# Patient Record
Sex: MALE | URBAN - METROPOLITAN AREA
[De-identification: names, ages, dates, MRNs, and addresses within clinical notes are randomized per-mention and may not be internally consistent; named-entity substitution may affect disease eponyms.]

---

## 2018-08-31 ENCOUNTER — OFFICE VISIT (OUTPATIENT)
Dept: FAMILY MEDICINE | Facility: CLINIC | Age: 63
End: 2018-08-31
Payer: COMMERCIAL

## 2018-08-31 VITALS
HEART RATE: 91 BPM | WEIGHT: 217.2 LBS | RESPIRATION RATE: 18 BRPM | HEIGHT: 71 IN | TEMPERATURE: 97 F | OXYGEN SATURATION: 99 % | BODY MASS INDEX: 30.41 KG/M2 | DIASTOLIC BLOOD PRESSURE: 89 MMHG | SYSTOLIC BLOOD PRESSURE: 132 MMHG

## 2018-08-31 DIAGNOSIS — H92.01 OTALGIA, RIGHT: Primary | ICD-10-CM

## 2018-08-31 DIAGNOSIS — B20 HUMAN IMMUNODEFICIENCY VIRUS (HIV) DISEASE (H): ICD-10-CM

## 2018-08-31 DIAGNOSIS — H66.90 ACUTE OTITIS MEDIA, UNSPECIFIED OTITIS MEDIA TYPE: ICD-10-CM

## 2018-08-31 DIAGNOSIS — H60.393 INFECTIVE OTITIS EXTERNA, BILATERAL: ICD-10-CM

## 2018-08-31 RX ORDER — AMOXICILLIN 500 MG/1
500 CAPSULE ORAL 2 TIMES DAILY
Qty: 14 CAPSULE | Refills: 0 | Status: SHIPPED | OUTPATIENT
Start: 2018-08-31

## 2018-08-31 RX ORDER — CIPROFLOXACIN AND DEXAMETHASONE 3; 1 MG/ML; MG/ML
4 SUSPENSION/ DROPS AURICULAR (OTIC) 2 TIMES DAILY
Qty: 7.5 ML | Refills: 0 | Status: SHIPPED | OUTPATIENT
Start: 2018-08-31 | End: 2018-09-07

## 2018-08-31 ASSESSMENT — ENCOUNTER SYMPTOMS
FACIAL SWELLING: 0
DIARRHEA: 0
NECK PAIN: 0
WEAKNESS: 0
WHEEZING: 0
CHILLS: 0
RHINORRHEA: 0
SINUS PRESSURE: 0
SHORTNESS OF BREATH: 0
FATIGUE: 0
NAUSEA: 0
ACTIVITY CHANGE: 0
DIZZINESS: 0
SINUS PAIN: 0
EYE DISCHARGE: 0
COLOR CHANGE: 0
SORE THROAT: 0
FEVER: 0
CHOKING: 0
CHEST TIGHTNESS: 0
VOMITING: 0
CONSTIPATION: 0
LIGHT-HEADEDNESS: 0
WOUND: 0
COUGH: 0
TROUBLE SWALLOWING: 0

## 2018-08-31 ASSESSMENT — ANXIETY QUESTIONNAIRES
7. FEELING AFRAID AS IF SOMETHING AWFUL MIGHT HAPPEN: NOT AT ALL
6. BECOMING EASILY ANNOYED OR IRRITABLE: NOT AT ALL
IF YOU CHECKED OFF ANY PROBLEMS ON THIS QUESTIONNAIRE, HOW DIFFICULT HAVE THESE PROBLEMS MADE IT FOR YOU TO DO YOUR WORK, TAKE CARE OF THINGS AT HOME, OR GET ALONG WITH OTHER PEOPLE: NOT DIFFICULT AT ALL
1. FEELING NERVOUS, ANXIOUS, OR ON EDGE: NOT AT ALL
2. NOT BEING ABLE TO STOP OR CONTROL WORRYING: NOT AT ALL
3. WORRYING TOO MUCH ABOUT DIFFERENT THINGS: NOT AT ALL
GAD7 TOTAL SCORE: 0
5. BEING SO RESTLESS THAT IT IS HARD TO SIT STILL: NOT AT ALL

## 2018-08-31 ASSESSMENT — PATIENT HEALTH QUESTIONNAIRE - PHQ9: 5. POOR APPETITE OR OVEREATING: NOT AT ALL

## 2018-08-31 NOTE — PATIENT INSTRUCTIONS
External Ear Infection (Adult)    External otitis (also called  swimmer s ear ) is an infection in the ear canal. It is often caused by bacteria or fungus. It can occur a few days after water gets trapped in the ear canal (from swimming or bathing). It can also occur after cleaning too deeply in the ear canal with a cotton swab or other object. Sometimes, hair care products get into the ear canal and cause this problem.  Symptoms can include pain, fever, itching, redness, drainage, or swelling of the ear canal. Temporary hearing loss may also occur.  Home care    Do not try to clean the ear canal. This can push pus and bacteria deeper into the canal.    Use prescribed ear drops as directed. These help reduce swelling and fight the infection. If an ear wick was placed in the ear canal, apply drops right onto the end of the wick. The wick will draw the medicine into the ear canal even if it is swollen closed.    A cotton ball may be loosely placed in the outer ear to absorb any drainage.    You may use acetaminophen or ibuprofen to control pain, unless another medicine was prescribed. Note: If you have chronic liver or kidney disease or ever had a stomach ulcer or GI bleeding, talk to your healthcare provider before taking any of these medicines.    Do not allow water to get into your ear when bathing. Also, don't swim until the infection has cleared.  Prevention    Keep your ears dry. This helps lower the risk of infection. Dry your ears with a towel or hair dryer after getting wet. Also, use ear plugs when swimming.    Do not stick any objects in the ear to remove wax.    If you feel water trapped in your ear, use ear drops right away. You can get these drops over the counter at most drugstores. They work by removing water from the ear canal.  Follow-up care  Follow up with your healthcare provider in 1 week, or as advised.  When to seek medical advice  Call your healthcare provider right away if any of these  occur:    Ear pain becomes worse or doesn t improve after 3 days of treatment    Redness or swelling of the outer ear occurs or gets worse    Headache    Painful or stiff neck    Drowsiness or confusion    Fever of 100.4 F (38 C) or higher, or as directed by your healthcare provider    Seizure  Date Last Reviewed: 10/1/2017    [id number] [employer]. [address] [place] [address]. All rights reserved. This information is not intended as a substitute for professional medical care. Always follow your healthcare professional's instructions.      Amoxicillin capsules or tablets  Brand Names: Amoxil, Moxilin, Sumox, Trimox  What is this medicine?  AMOXICILLIN (a mox i MAT in) is a penicillin antibiotic. It is used to treat certain kinds of bacterial infections. It will not work for colds, flu, or other viral infections.  How should I use this medicine?  Take this medicine by mouth with a glass of water. Follow the directions on your prescription label. You may take this medicine with food or on an empty stomach. Take your medicine at regular intervals. Do not take your medicine more often than directed. Take all of your medicine as directed even if you think your are better. Do not skip doses or stop your medicine early.  Talk to your pediatrician regarding the use of this medicine in children. While this drug may be prescribed for selected conditions, precautions do apply.  What side effects may I notice from receiving this medicine?  Side effects that you should report to your doctor or health care professional as soon as possible:    allergic reactions like skin rash, itching or hives, swelling of the face, lips, or tongue    breathing problems    dark urine    redness, blistering, peeling or loosening of the skin, including inside the mouth    seizures    severe or watery diarrhea    trouble passing urine or change in the amount of urine    unusual bleeding or bruising    unusually weak or  tired    yellowing of the eyes or skin  Side effects that usually do not require medical attention (report to your doctor or health care professional if they continue or are bothersome):    dizziness    headache    stomach upset    trouble sleeping  What may interact with this medicine?    amiloride    birth control pills    chloramphenicol    macrolides    probenecid    sulfonamides    tetracyclines    What if I miss a dose?  If you miss a dose, take it as soon as you can. If it is almost time for your next dose, take only that dose. Do not take double or extra doses.  Where should I keep my medicine?  Keep out of the reach of children.  Store between 68 and 77 degrees F (20 and 25 degrees C). Keep bottle closed tightly. Throw away any unused medicine after the expiration date.  What should I tell my health care provider before I take this medicine?  They need to know if you have any of these conditions:    asthma    kidney disease    an unusual or allergic reaction to amoxicillin, other penicillins, cephalosporin antibiotics, other medicines, foods, dyes, or preservatives    pregnant or trying to get pregnant    breast-feeding  What should I watch for while using this medicine?  Tell your doctor or health care professional if your symptoms do not improve in 2 or 3 days. Take all of the doses of your medicine as directed. Do not skip doses or stop your medicine early.  If you are diabetic, you may get a false positive result for sugar in your urine with certain brands of urine tests. Check with your doctor.  Do not treat diarrhea with over-the-counter products. Contact your doctor if you have diarrhea that lasts more than 2 days or if the diarrhea is severe and watery.  NOTE:This sheet is a summary. It may not cover all possible information. If you have questions about this medicine, talk to your doctor, pharmacist, or health care provider. Copyright  2018 [employer]        Ciprofloxacin Hydrochloride, Dexamethasone  Otic drops, suspension  What is this medicine?  CIPROFLOXACIN; DEXAMETHASONE (sip lexi FLOX a sin; dex a METH a sone) is used to treat ear infections. It also stops the swelling and itching caused by the infection.  This medicine may be used for other purposes; ask your health care provider or pharmacist if you have questions.  What should I tell my health care provider before I take this medicine?  They need to know if you have any of these conditions:    any other active infection    viral ear infection    an unusual or allergic reaction to ciprofloxacin; dexamethasone, other medicines, foods, dyes, or preservatives    pregnant or trying to get pregnant    breast-feeding  How should I use this medicine?  This medicine is only for use in the ears. Wash your hands with soap and water. Do not insert any object or swab into the ear canal. Gently warm the bottle by holding it in the hand for 1 to 2 minutes. Shake the bottle immediately before using. Lie down on your side with the affected ear up. Try not to touch the tip of the dropper to your ear, fingertips, or other surface. Squeeze the bottle gently to put the prescribed number of drops in the ear canal. Stay in this position for 30 to 60 seconds to help the drops soak into the ear. Repeat the steps for the other ear if both ears are infected. Do not use your medicine more often than directed. Finish the full course of medicine prescribed by your doctor or health care professional even if you think your condition is better.  Talk to your pediatrician regarding the use of this medicine in children. While this drug may be prescribed for children as young as in children 6 months of age and older for selected conditions, precautions do apply.  Overdosage: If you think you have taken too much of this medicine contact a poison control center or emergency room at once.  NOTE: This medicine is only for you. Do not share this medicine with others.  What if I miss a dose?  If  you miss a dose, use it as soon as you can. If it is almost time for your next dose, use only that dose. Do not use double or extra doses.  What may interact with this medicine?  Interactions are not expected. Do not use any other ear products without telling your doctor or health care professional.  This list may not describe all possible interactions. Give your health care provider a list of all the medicines, herbs, non-prescription drugs, or dietary supplements you use. Also tell them if you smoke, drink alcohol, or use illegal drugs. Some items may interact with your medicine.  What should I watch for while using this medicine?  Tell your doctor or health care professional if your ear infection does not get better in a few days. If rash or allergic reaction occurs, stop using immediately and contact your doctor or health care professional.  It is important that you keep the infected ear(s) clean and dry. When bathing, try not to get the infected ear(s) wet. Do not go swimming unless your doctor or health care professional has told you otherwise.  To prevent the spread of infection, do not share ear products or share towels and washcloths with anyone else.  What side effects may I notice from receiving this medicine?  Side effects that you should report to your doctor or health care professional as soon as possible:    allergic reactions like skin rash, itching or hives, swelling of the face, lips, or tongue    burning, itching, and redness    worsening ear pain  Side effects that usually do not require medical attention (report to your doctor or health care professional if they continue or are bothersome):    abnormal feeling in the ear    headache    unpleasant feeling while putting the drops in the ear  This list may not describe all possible side effects. Call your doctor for medical advice about side effects. You may report side effects to FDA at 0-598-FDA-5594.  Where should I keep my medicine?  Keep out  of the reach of children.  Store at room temperature between 15 and 30 degrees C (59 and 86 degrees F). Do not freeze. Protect from light. Throw away any unused medicine after the expiration date.  NOTE:This sheet is a summary. It may not cover all possible information. If you have questions about this medicine, talk to your doctor, pharmacist, or health care provider. Copyright  2016 [employer]

## 2018-08-31 NOTE — MR AVS SNAPSHOT
After Visit Summary   8/31/2018    Morgan Ferris    MRN: [id number]           Patient Information     Date Of Birth          1955        Visit Information        Provider Department      8/31/2018 1:15 PM Laura Olivera APRN CNP [facility]        Today's Diagnoses     Otalgia, right    -  1    Infective otitis externa, bilateral        Acute otitis media, unspecified otitis media type          Care Instructions      External Ear Infection (Adult)    External otitis (also called  swimmer s ear ) is an infection in the ear canal. It is often caused by bacteria or fungus. It can occur a few days after water gets trapped in the ear canal (from swimming or bathing). It can also occur after cleaning too deeply in the ear canal with a cotton swab or other object. Sometimes, hair care products get into the ear canal and cause this problem.  Symptoms can include pain, fever, itching, redness, drainage, or swelling of the ear canal. Temporary hearing loss may also occur.  Home care    Do not try to clean the ear canal. This can push pus and bacteria deeper into the canal.    Use prescribed ear drops as directed. These help reduce swelling and fight the infection. If an ear wick was placed in the ear canal, apply drops right onto the end of the wick. The wick will draw the medicine into the ear canal even if it is swollen closed.    A cotton ball may be loosely placed in the outer ear to absorb any drainage.    You may use acetaminophen or ibuprofen to control pain, unless another medicine was prescribed. Note: If you have chronic liver or kidney disease or ever had a stomach ulcer or GI bleeding, talk to your healthcare provider before taking any of these medicines.    Do not allow water to get into your ear when bathing. Also, don't swim until the infection has cleared.  Prevention    Keep your ears dry. This helps lower the risk of infection. Dry your ears with a towel or hair dryer  after getting wet. Also, use ear plugs when swimming.    Do not stick any objects in the ear to remove wax.    If you feel water trapped in your ear, use ear drops right away. You can get these drops over the counter at most drugstores. They work by removing water from the ear canal.  Follow-up care  Follow up with your healthcare provider in 1 week, or as advised.  When to seek medical advice  Call your healthcare provider right away if any of these occur:    Ear pain becomes worse or doesn t improve after 3 days of treatment    Redness or swelling of the outer ear occurs or gets worse    Headache    Painful or stiff neck    Drowsiness or confusion    Fever of 100.4 F (38 C) or higher, or as directed by your healthcare provider    Seizure  Date Last Reviewed: 10/1/2017    9368-3402 [employer]. [address] [place], [place] [address]. All rights reserved. This information is not intended as a substitute for professional medical care. Always follow your healthcare professional's instructions.      Amoxicillin capsules or tablets  Brand Names: Amoxil, Moxilin, Sumox, Trimox  What is this medicine?  AMOXICILLIN (a mox i MAT in) is a penicillin antibiotic. It is used to treat certain kinds of bacterial infections. It will not work for colds, flu, or other viral infections.  How should I use this medicine?  Take this medicine by mouth with a glass of water. Follow the directions on your prescription label. You may take this medicine with food or on an empty stomach. Take your medicine at regular intervals. Do not take your medicine more often than directed. Take all of your medicine as directed even if you think your are better. Do not skip doses or stop your medicine early.  Talk to your pediatrician regarding the use of this medicine in children. While this drug may be prescribed for selected conditions, precautions do apply.  What side effects may I notice from receiving this medicine?  Side effects  that you should report to your doctor or health care professional as soon as possible:    allergic reactions like skin rash, itching or hives, swelling of the face, lips, or tongue    breathing problems    dark urine    redness, blistering, peeling or loosening of the skin, including inside the mouth    seizures    severe or watery diarrhea    trouble passing urine or change in the amount of urine    unusual bleeding or bruising    unusually weak or tired    yellowing of the eyes or skin  Side effects that usually do not require medical attention (report to your doctor or health care professional if they continue or are bothersome):    dizziness    headache    stomach upset    trouble sleeping  What may interact with this medicine?    amiloride    birth control pills    chloramphenicol    macrolides    probenecid    sulfonamides    tetracyclines    What if I miss a dose?  If you miss a dose, take it as soon as you can. If it is almost time for your next dose, take only that dose. Do not take double or extra doses.  Where should I keep my medicine?  Keep out of the reach of children.  Store between 68 and 77 degrees F (20 and 25 degrees C). Keep bottle closed tightly. Throw away any unused medicine after the expiration date.  What should I tell my health care provider before I take this medicine?  They need to know if you have any of these conditions:    asthma    kidney disease    an unusual or allergic reaction to amoxicillin, other penicillins, cephalosporin antibiotics, other medicines, foods, dyes, or preservatives    pregnant or trying to get pregnant    breast-feeding  What should I watch for while using this medicine?  Tell your doctor or health care professional if your symptoms do not improve in 2 or 3 days. Take all of the doses of your medicine as directed. Do not skip doses or stop your medicine early.  If you are diabetic, you may get a false positive result for sugar in your urine with certain brands  of urine tests. Check with your doctor.  Do not treat diarrhea with over-the-counter products. Contact your doctor if you have diarrhea that lasts more than 2 days or if the diarrhea is severe and watery.  NOTE:This sheet is a summary. It may not cover all possible information. If you have questions about this medicine, talk to your doctor, pharmacist, or health care provider. Copyright  2018 [employer]        Ciprofloxacin Hydrochloride, Dexamethasone Otic drops, suspension  What is this medicine?  CIPROFLOXACIN; DEXAMETHASONE (sip lexi FLOX a sin; dex a METH a sone) is used to treat ear infections. It also stops the swelling and itching caused by the infection.  This medicine may be used for other purposes; ask your health care provider or pharmacist if you have questions.  What should I tell my health care provider before I take this medicine?  They need to know if you have any of these conditions:    any other active infection    viral ear infection    an unusual or allergic reaction to ciprofloxacin; dexamethasone, other medicines, foods, dyes, or preservatives    pregnant or trying to get pregnant    breast-feeding  How should I use this medicine?  This medicine is only for use in the ears. Wash your hands with soap and water. Do not insert any object or swab into the ear canal. Gently warm the bottle by holding it in the hand for 1 to 2 minutes. Shake the bottle immediately before using. Lie down on your side with the affected ear up. Try not to touch the tip of the dropper to your ear, fingertips, or other surface. Squeeze the bottle gently to put the prescribed number of drops in the ear canal. Stay in this position for 30 to 60 seconds to help the drops soak into the ear. Repeat the steps for the other ear if both ears are infected. Do not use your medicine more often than directed. Finish the full course of medicine prescribed by your doctor or health care professional even if you think your condition is  better.  Talk to your pediatrician regarding the use of this medicine in children. While this drug may be prescribed for children as young as in children 6 months of age and older for selected conditions, precautions do apply.  Overdosage: If you think you have taken too much of this medicine contact a poison control center or emergency room at once.  NOTE: This medicine is only for you. Do not share this medicine with others.  What if I miss a dose?  If you miss a dose, use it as soon as you can. If it is almost time for your next dose, use only that dose. Do not use double or extra doses.  What may interact with this medicine?  Interactions are not expected. Do not use any other ear products without telling your doctor or health care professional.  This list may not describe all possible interactions. Give your health care provider a list of all the medicines, herbs, non-prescription drugs, or dietary supplements you use. Also tell them if you smoke, drink alcohol, or use illegal drugs. Some items may interact with your medicine.  What should I watch for while using this medicine?  Tell your doctor or health care professional if your ear infection does not get better in a few days. If rash or allergic reaction occurs, stop using immediately and contact your doctor or health care professional.  It is important that you keep the infected ear(s) clean and dry. When bathing, try not to get the infected ear(s) wet. Do not go swimming unless your doctor or health care professional has told you otherwise.  To prevent the spread of infection, do not share ear products or share towels and washcloths with anyone else.  What side effects may I notice from receiving this medicine?  Side effects that you should report to your doctor or health care professional as soon as possible:    allergic reactions like skin rash, itching or hives, swelling of the face, lips, or tongue    burning, itching, and redness    worsening ear  pain  Side effects that usually do not require medical attention (report to your doctor or health care professional if they continue or are bothersome):    abnormal feeling in the ear    headache    unpleasant feeling while putting the drops in the ear  This list may not describe all possible side effects. Call your doctor for medical advice about side effects. You may report side effects to FDA at 3-233-LCU-1495.  Where should I keep my medicine?  Keep out of the reach of children.  Store at room temperature between 15 and 30 degrees C (59 and 86 degrees F). Do not freeze. Protect from light. Throw away any unused medicine after the expiration date.  NOTE:This sheet is a summary. It may not cover all possible information. If you have questions about this medicine, talk to your doctor, pharmacist, or health care provider. Copyright  2016 [employer]                      Follow-ups after your visit        Who to contact     Please call your clinic at [phone] to:    Ask questions about your health    Make or cancel appointments    Discuss your medicines    Learn about your test results    Speak to your doctor            Additional Information About Your Visit        [employer] Information     [employer] is an electronic gateway that provides easy, online access to your medical records. With [employer], you can request a clinic appointment, read your test results, renew a prescription or communicate with your care team.     To sign up for [employer] visit the website at [email or web address]   You will be asked to enter the access code listed below, as well as some personal information. Please follow the directions to create your username and password.     Your access code is: [email or web address]  Expires: 2018  1:42 PM     Your access code will  in 90 days. If you need help or a new code, please contact your [facility] or call [phone] for assistance.        Care EveryWhere ID   "   This is your Care EveryWhere ID. This could be used by other organizations to access your [place] medical records  [id number]        Your Vitals Were     Pulse Temperature Respirations Height Pulse Oximetry BMI (Body Mass Index)    91 97  F (36.1  C) (Oral) 18 5' 10.5\" (179.1 cm) 99% 30.72 kg/m2       Blood Pressure from Last 3 Encounters:   08/31/18 132/89    Weight from Last 3 Encounters:   08/31/18 217 lb 3.2 oz (98.5 kg)              Today, you had the following     No orders found for display         Today's Medication Changes          These changes are accurate as of 8/31/18  1:42 PM.  If you have any questions, ask your nurse or doctor.               Start taking these medicines.        Dose/Directions    amoxicillin 500 MG capsule   Commonly known as:  AMOXIL   Used for:  Infective otitis externa, bilateral, Acute otitis media, unspecified otitis media type   Started by:  Laura Olivera APRN CNP        Dose:  500 mg   Take 1 capsule (500 mg) by mouth 2 times daily   Quantity:  14 capsule   Refills:  0       ciprofloxacin-dexamethasone otic suspension   Commonly known as:  CIPRODEX   Used for:  Infective otitis externa, bilateral   Started by:  Laura Olivera APRN CNP        Dose:  4 drop   Place 4 drops into both ears 2 times daily for 7 days   Quantity:  7.5 mL   Refills:  0            Where to get your medicines      These medications were sent to [facility]/pharmacy #[id number] - [address] AT CORNER OF [address] [place] [id number]     Phone:  [phone]     amoxicillin 500 MG capsule    ciprofloxacin-dexamethasone otic suspension                Primary Care Provider    None Specified       No primary provider on file.        Equal Access to Services     [facility]: Jose Raul Byrne, wakt medel, qaybta blaire hartley. [facility] [phone].    ATENCIÓN: Si habla español, tiene a olea disposición " servicios gratuitos de asistencia lingüística. Boris nolen [phone].    We comply with applicable federal civil rights laws and [place] laws. We do not discriminate on the basis of race, color, national origin, age, disability, sex, sexual orientation, or gender identity.            Thank you!     Thank you for choosing [facility]  for your care. Our goal is always to provide you with excellent care. Hearing back from our patients is one way we can continue to improve our services. Please take a few minutes to complete the written survey that you may receive in the mail after your visit with us. Thank you!             Your Updated Medication List - Protect others around you: Learn how to safely use, store and throw away your medicines at www.disposemymeds.org.          This list is accurate as of 8/31/18  1:42 PM.  Always use your most recent med list.                   Brand Name Dispense Instructions for use Diagnosis    amoxicillin 500 MG capsule    AMOXIL    14 capsule    Take 1 capsule (500 mg) by mouth 2 times daily    Infective otitis externa, bilateral, Acute otitis media, unspecified otitis media type       ciprofloxacin-dexamethasone otic suspension    CIPRODEX    7.5 mL    Place 4 drops into both ears 2 times daily for 7 days    Infective otitis externa, bilateral       TRIUMEQ PO      Take by mouth daily        valACYclovir 50 mg/mL Susp    VALTREX     Take by mouth daily

## 2018-08-31 NOTE — PROGRESS NOTES
"       HPI       Morgan Ferris is a 62 year old  who presents for   Chief Complaint   Patient presents with     Otalgia     Pt. presents to the clinic today with Right ear pain X 1 day.       Michael presents to the clinic today for evaluation of right sided ear pain that started last night. Has been having itching, pain, scant drainage. Pain rated currently 2/10, earlier this morning was 5/10, and is starting to go down neck as well. Took ibuprofen and was effective. Feels as though left ear is \"itching\" as well. Reports history of \"scabs\" in right ear. Denies any recent swimming, travel, or sick exposures. Denies any trauma to ear, did try to put neosporin in canals to help with discomfort. Has had some itching in past of right ear canal off and on for several years; denies any previous ear pain. Denies any trauma to right ear. Has not checked temperature, did feel \"feverish\" last night when pain woke him. No nausea, vomiting, neck pain, other sinus concerns at this time.     Problem, Medication and Allergy Lists were       Current Outpatient Prescriptions   Medication Sig Dispense Refill     Abacavir-Dolutegravir-Lamivud (TRIUMEQ PO) Take by mouth daily       amoxicillin (AMOXIL) 500 MG capsule Take 1 capsule (500 mg) by mouth 2 times daily 14 capsule 0     ciprofloxacin-dexamethasone (CIPRODEX) otic suspension Place 4 drops into both ears 2 times daily for 7 days 7.5 mL 0     valACYclovir (VALTREX) 50 mg/mL SUSP Take by mouth daily         No Known Allergies.    Patient is a new patient to this clinic and so  I reviewed/updated the Past Medical History, the Family History and the Social History .         Review of Systems:   Review of Systems   Constitutional: Negative for activity change, chills, fatigue and fever.   HENT: Positive for ear discharge, ear pain and hearing loss. Negative for congestion, facial swelling, nosebleeds, postnasal drip, rhinorrhea, sinus pain, sinus pressure, sneezing, sore throat, " "tinnitus and trouble swallowing.    Eyes: Negative for discharge.   Respiratory: Negative for cough, choking, chest tightness, shortness of breath and wheezing.    Cardiovascular: Negative for chest pain.   Gastrointestinal: Negative for constipation, diarrhea, nausea and vomiting.   Musculoskeletal: Negative for neck pain.   Skin: Negative for color change, pallor, rash and wound.   Neurological: Negative for dizziness, weakness and light-headedness.     See under HPI for additional ROS.        Physical Exam:     Vitals:    08/31/18 1326   BP: 132/89   BP Location: Right arm   Patient Position: Chair   Cuff Size: Adult Regular   Pulse: 91   Resp: 18   Temp: 97  F (36.1  C)   TempSrc: Oral   SpO2: 99%   Weight: 217 lb 3.2 oz (98.5 kg)   Height: 5' 10.5\" (179.1 cm)     Body mass index is 30.72 kg/(m^2).  Vitals were reviewed and were normal     Physical Exam   Constitutional: He is oriented to person, place, and time. He appears well-developed and well-nourished. No distress.   HENT:   Head: Normocephalic and atraumatic.   Right Ear: There is swelling. Decreased hearing is noted.   Left Ear: Hearing and tympanic membrane normal.   Nose: Nose normal. Right sinus exhibits no maxillary sinus tenderness and no frontal sinus tenderness. Left sinus exhibits no maxillary sinus tenderness and no frontal sinus tenderness.   Mouth/Throat: Uvula is midline, oropharynx is clear and moist and mucous membranes are normal. No oropharyngeal exudate.   Swelling in right canal; unable to visualize TM clearly. Erythema noted along right canal.     Erythema of left ear canal upon exam.    Eyes: Conjunctivae and EOM are normal. Pupils are equal, round, and reactive to light. Right eye exhibits no discharge. Left eye exhibits no discharge.   Neck: Normal range of motion. Neck supple. No thyromegaly present.   Cardiovascular: Normal rate, regular rhythm and normal heart sounds.  Exam reveals no gallop and no friction rub.    No murmur " "heard.  Pulmonary/Chest: Effort normal and breath sounds normal. No respiratory distress. He has no wheezes. He has no rales. He exhibits no tenderness.   Lymphadenopathy:     He has cervical adenopathy.   Neurological: He is alert and oriented to person, place, and time. No cranial nerve deficit.   Skin: Skin is warm and dry. No rash noted. He is not diaphoretic. No erythema. No pallor.   Psychiatric: He has a normal mood and affect. His behavior is normal.   Vitals reviewed.        Results:   No testing ordered today    Assessment and Plan        1. Otalgia, right    2. Infective otitis externa, bilateral  With noted redness, irritation, and \"itching\" sensation to bilateral ears will proceed to treat with empiric therapy. Michael advised on how to use drops, symptoms to watch for and have evaluated if worsened, and side effects of medication.   - ciprofloxacin-dexamethasone (CIPRODEX) otic suspension; Place 4 drops into both ears 2 times daily for 7 days  Dispense: 7.5 mL; Refill: 0  - amoxicillin (AMOXIL) 500 MG capsule; Take 1 capsule (500 mg) by mouth 2 times daily  Dispense: 14 capsule; Refill: 0    3. Acute otitis media, unspecified otitis media type  Unable to visualize TM on exam today; with increase in pain, and tenderness going down and into neck with cervical lymphadenopathy will treat with amoxicillin. Advised Michael of side effects of medication, when to return to clinic for further evaluation (or when to present to ED).   - amoxicillin (AMOXIL) 500 MG capsule; Take 1 capsule (500 mg) by mouth 2 times daily  Dispense: 14 capsule; Refill: 0       There are no discontinued medications.    Options for treatment and follow-up care were reviewed with the patient. Morgan Ferris  engaged in the decision making process and verbalized understanding of the options discussed and agreed with the final plan.    Laura Olivera, ASIA CNP  "

## 2018-08-31 NOTE — NURSING NOTE
62 year old  Chief Complaint   Patient presents with     Otalgia     Pt. presents to the clinic today with Right ear pain X 1 day.       There were no vitals taken for this visit. There is no height or weight on file to calculate BMI.  BP completed using cuff size:    There is no problem list on file for this patient.      Wt Readings from Last 2 Encounters:   No data found for Wt     BP Readings from Last 3 Encounters:   No data found for BP       No Known Allergies    Current Outpatient Prescriptions   Medication     Abacavir-Dolutegravir-Lamivud (TRIUMEQ PO)     valACYclovir (VALTREX) 50 mg/mL SUSP     No current facility-administered medications for this visit.        Social History   Substance Use Topics     Smoking status: Not on file     Smokeless tobacco: Not on file     Alcohol use Not on file         Honoring Choices - Health Care Directive Guide offered to patient at time of visit.    Health Maintenance Due   Topic Date Due     PHQ-2 Q1 YR  11/01/1966     TETANUS IMMUNIZATION (SYSTEM ASSIGNED)  11/01/1972     HIV SCREEN (SYSTEM ASSIGNED)  11/01/1972     HEPATITIS C SCREENING  11/01/1972     LIPID SCREEN Q5 YR MALE (SYSTEM ASSIGNED)  11/01/1989     COLON CANCER SCREEN (SYSTEM ASSIGNED)  11/01/2004     ADVANCE DIRECTIVE PLANNING Q5 YRS  11/01/2009         There is no immunization history on file for this patient.    No results found for: PAP      No lab results found.    PHQ-2 ( 1999 Pfizer) 8/31/2018   Q1: Little interest or pleasure in doing things 0   Q2: Feeling down, depressed or hopeless 0   PHQ-2 Score 0       PHQ-9 SCORE 8/31/2018   Total Score 0       KRISH-7 SCORE 8/31/2018   Total Score 0       No flowsheet data found.    Melanie Genao CMA  August 31, 2018 1:25 PM

## 2018-09-01 ASSESSMENT — PATIENT HEALTH QUESTIONNAIRE - PHQ9: SUM OF ALL RESPONSES TO PHQ QUESTIONS 1-9: 0

## 2018-09-01 ASSESSMENT — ANXIETY QUESTIONNAIRES: GAD7 TOTAL SCORE: 0

## 2019-02-25 ENCOUNTER — TELEPHONE (OUTPATIENT)
Dept: FAMILY MEDICINE | Facility: CLINIC | Age: 64
End: 2019-02-25

## 2019-02-25 NOTE — TELEPHONE ENCOUNTER
Called patient to schedule an annual exam per provider request. Patient stated that they see another NP down in [place] where he is currently at. Patient declined to schedule as he has already had one.     Genie Gleason [facility]